# Patient Record
Sex: FEMALE | URBAN - METROPOLITAN AREA
[De-identification: names, ages, dates, MRNs, and addresses within clinical notes are randomized per-mention and may not be internally consistent; named-entity substitution may affect disease eponyms.]

---

## 2024-07-19 ENCOUNTER — APPOINTMENT (RX ONLY)
Dept: URBAN - METROPOLITAN AREA CLINIC 69 | Facility: CLINIC | Age: 59
Setting detail: DERMATOLOGY
End: 2024-07-19

## 2024-07-19 DIAGNOSIS — H02.82 CYSTS OF EYELID: ICD-10-CM

## 2024-07-19 DIAGNOSIS — L72.0 EPIDERMAL CYST: ICD-10-CM

## 2024-07-19 PROBLEM — H02.829 CYSTS OF UNSPECIFIED EYE, UNSPECIFIED EYELID: Status: ACTIVE | Noted: 2024-07-19

## 2024-07-19 PROCEDURE — ? COSMETIC EXTRACTIONS

## 2024-07-19 PROCEDURE — ? COUNSELING

## 2024-07-19 PROCEDURE — 99202 OFFICE O/P NEW SF 15 MIN: CPT

## 2024-07-19 ASSESSMENT — LOCATION DETAILED DESCRIPTION DERM
LOCATION DETAILED: RIGHT MEDIAL EYEBROW
LOCATION DETAILED: RIGHT ORAL COMMISSURE
LOCATION DETAILED: RIGHT CENTRAL EYEBROW

## 2024-07-19 ASSESSMENT — LOCATION SIMPLE DESCRIPTION DERM
LOCATION SIMPLE: RIGHT LIP
LOCATION SIMPLE: RIGHT EYEBROW

## 2024-07-19 ASSESSMENT — LOCATION ZONE DERM
LOCATION ZONE: FACE
LOCATION ZONE: LIP

## 2024-07-19 NOTE — PROCEDURE: COSMETIC EXTRACTIONS
Detail Level: Detailed
Price (Use Numbers Only, No Special Characters Or $): 100.00
Render The Number Of Extractions: Yes
Anesthesia Volume In Cc: 0.2
Post-Care Instructions: I reviewed with the patient in detail post-care instructions. Patient is to wear sunprotection, and avoid picking at any of the treated lesions. Pt may apply Vaseline to crusted or scabbing areas.
Consent: The patient's consent was obtained including but not limited to risks of crusting, scabbing, blistering, scarring, darker or lighter pigmentary change, recurrence, incomplete removal and infection.